# Patient Record
Sex: MALE | Race: BLACK OR AFRICAN AMERICAN | URBAN - METROPOLITAN AREA
[De-identification: names, ages, dates, MRNs, and addresses within clinical notes are randomized per-mention and may not be internally consistent; named-entity substitution may affect disease eponyms.]

---

## 2018-07-29 ENCOUNTER — EMERGENCY (EMERGENCY)
Facility: HOSPITAL | Age: 38
LOS: 1 days | Discharge: HOME | End: 2018-07-29
Attending: EMERGENCY MEDICINE
Payer: COMMERCIAL

## 2018-07-29 VITALS
OXYGEN SATURATION: 99 % | RESPIRATION RATE: 18 BRPM | TEMPERATURE: 99 F | HEART RATE: 80 BPM | SYSTOLIC BLOOD PRESSURE: 170 MMHG | DIASTOLIC BLOOD PRESSURE: 89 MMHG

## 2018-07-29 VITALS
RESPIRATION RATE: 16 BRPM | TEMPERATURE: 98 F | HEART RATE: 76 BPM | WEIGHT: 214.95 LBS | HEIGHT: 67 IN | OXYGEN SATURATION: 98 % | SYSTOLIC BLOOD PRESSURE: 161 MMHG | DIASTOLIC BLOOD PRESSURE: 93 MMHG

## 2018-07-29 PROCEDURE — 99283 EMERGENCY DEPT VISIT LOW MDM: CPT

## 2018-07-29 RX ORDER — CIPROFLOXACIN AND DEXAMETHASONE 3; 1 MG/ML; MG/ML
4 SUSPENSION/ DROPS AURICULAR (OTIC) ONCE
Qty: 0 | Refills: 0 | Status: COMPLETED | OUTPATIENT
Start: 2018-07-29 | End: 2018-07-29

## 2018-07-29 RX ORDER — IBUPROFEN 200 MG
600 TABLET ORAL ONCE
Qty: 0 | Refills: 0 | Status: COMPLETED | OUTPATIENT
Start: 2018-07-29 | End: 2018-07-29

## 2018-07-29 RX ADMIN — CIPROFLOXACIN AND DEXAMETHASONE 4 DROP(S): 3; 1 SUSPENSION/ DROPS AURICULAR (OTIC) at 14:40

## 2018-07-29 RX ADMIN — Medication 600 MILLIGRAM(S): at 13:00

## 2018-07-29 RX ADMIN — Medication 600 MILLIGRAM(S): at 12:16

## 2018-07-29 NOTE — ED PROVIDER NOTE - OBJECTIVE STATEMENT
Attending note. Patient was seen in the fast track room #5. Patient is complaining of foreign body in the right ear today. Patient was attempted to scratch is here with a pin and the plastic and came off in his right ear.

## 2018-07-29 NOTE — ED SUB INTERN NOTE - MEDICAL DECISION MAKING DETAILS
38yM w/ round foreign body lodged tightly in R ear. Will attempt to remove with curette, irrigation, suction. 38yM w/ round foreign body lodged tightly in R ear. Will attempt to remove with curette, irrigation, suction, glue with Dermabond.  Ibuprofen for pain after instrumentation.  ENT consulted

## 2018-07-29 NOTE — ED SUB INTERN NOTE - PHYSICAL EXAMINATION
HEENT: White jen-shaped bead lodged in R-external ear canal--wedged tightly with no space around it visualized. Small abrasion to posterior ear canal without bleeding.  NEURO: non focal, CN2-12 intact, no motor/sensory deficits  HEART: RRR, s1 s2, no m/r/g  LUNGS: CTAB  ABD: NBS, SNTND  EXT: pulses equal b/l, no weakness, edema

## 2018-07-29 NOTE — ED PROVIDER NOTE - MEDICAL DECISION MAKING DETAILS
Attending note-foreign body right ear canal. We'll attempt to curette, irrigation, suction, glue with Dermabond.  if unsuccessful, ENT consultation.

## 2018-07-29 NOTE — ED SUB INTERN NOTE - OBJECTIVE STATEMENT FT
38yM with no PMH p/w foreign body in R ear. 1 hr ago 38yM with no PMH p/w foreign body in R ear. 1 hr ago, pt was scratching an itch with the blunt end of a push pin when the round plastic bead became unattached to the pin and lodged in the ear canal. Pt reports muffled hearing on the R-side. He used the sharp end of the pin in an attempt to gently dig out the bead. Denies pain, bleeding, tinnitus, paresthesias, CP, SOB, n/v, HA, dizziness. 38yM with no PMH p/w foreign body in R ear. 1 hr ago, pt was scratching an itch with the blunt end of a push pin when the round plastic bead became unattached to the pin and lodged in the ear canal. Pt reports muffled hearing on the R-side. He used the sharp end of the pin in an attempt to gently dig out the bead. Denies pain, bleeding, tinnitus, paresthesias, fevers, chills, CP, SOB, n/v, HA, dizziness.

## 2018-07-29 NOTE — ED ADULT NURSE NOTE - OBJECTIVE STATEMENT
89 year old male patient presents to ED c/o foreign body to R ear. Patient states he was using a pin to scratch his ear at about 9am when the top piece (a small jen) broke off and got stuck in his ear. Denies taking pain medication PTA. MD at bedside to attempt to remove- patient reporting pain after removal attempt. Denies HA or dizziness.

## 2018-07-29 NOTE — ED PROVIDER NOTE - PHYSICAL EXAMINATION
Attending note. Patient is alert and in no acute distress. Examination of the right ear reveals a foreign body in the right ear canal. There is a white spherical foreign body wedge in the ear canal. Tympanic membrane cannot be visualized.

## 2018-07-29 NOTE — ED PROVIDER NOTE - PROGRESS NOTE DETAILS
Spoke to ENT PA who received recommendations from Dr. Albert: pt to be given Ciprodex drops and instructed to take 4 drops BID x 7 days in affected ear, beginning today, and will also call the instructed number today to arrange clinic f/u for tonight or tomorrow. ENT no longer has the equipment stocked in hospital for foreign body removal -Matthieu

## 2018-07-30 ENCOUNTER — EMERGENCY (EMERGENCY)
Facility: HOSPITAL | Age: 38
LOS: 1 days | End: 2018-07-30
Attending: EMERGENCY MEDICINE
Payer: COMMERCIAL

## 2018-07-30 VITALS
TEMPERATURE: 98 F | RESPIRATION RATE: 16 BRPM | HEIGHT: 68 IN | SYSTOLIC BLOOD PRESSURE: 154 MMHG | OXYGEN SATURATION: 96 % | DIASTOLIC BLOOD PRESSURE: 89 MMHG | WEIGHT: 220.02 LBS | HEART RATE: 86 BPM

## 2018-07-30 PROCEDURE — 99284 EMERGENCY DEPT VISIT MOD MDM: CPT

## 2018-07-30 PROCEDURE — 99285 EMERGENCY DEPT VISIT HI MDM: CPT | Mod: 25

## 2018-07-30 PROCEDURE — 31575 DIAGNOSTIC LARYNGOSCOPY: CPT

## 2018-07-30 NOTE — ED PROVIDER NOTE - RIGHT EAR
erythema to EAC s/p FB removal; TM intact/TM clear erythema and minimal bleeding in EAC s/p FB removal; TM intact/TM clear

## 2018-07-30 NOTE — ED PROVIDER NOTE - PROGRESS NOTE DETAILS
Successful small, round, white foreign body removal from R ear by Dr. Albert. Pt instructed to continue ciprodex drops. Pt feeling much better, reports he can now hear out of R ear. - Althea Fofana PA-C

## 2018-07-30 NOTE — ED PROVIDER NOTE - PHYSICAL EXAMINATION
Patient evaluated after removal of foreign body. Patient evaluated after removal of foreign body.       Attending note. After removal of foreign body by Dr. Albert your canal was examined and was slightly edematous with a abrasion on the posterior aspect of the canal with some bleeding. Tympanic membrane was intact. Gross hearing is intact.

## 2018-07-30 NOTE — CONSULT NOTE ADULT - SUBJECTIVE AND OBJECTIVE BOX
HPI: The patient is a 38 year old male with no significant past medical history, who presents to the emergency room at [facility] with chief complaint of right ear pain for the past day.  The patient had a small ball of push pin stuck in ear since yesterday.  He was attempting to scratch ear when ball fell off. Pt seen in [facility] ED early yesterday afternoon, and after unsuccessful removal, was d/c to f/u with ENT. Patient with occasional dripping feeling in back of throat and occasional changes in voice.	    HIV:    HIV Status:  · Offered: Declined	    PMHx:  none    PSHx:  none    Meds:  none    All:  NKDA    SH:  occasional ETOH  denies tobacco

## 2018-07-30 NOTE — CONSULT NOTE ADULT - ASSESSMENT
Assessment:  The patient is a 38 year old male with no significant past medical history, who presents to the emergency room at [facility] with chief complaint of right ear pain for the past day.  Ddx GERD and foreign body right external ear canal    Plan:  1) removal of right ear canal foreign body at bedside  2) ciprodex otic bid x 5 days  3) f/u with PMD prn

## 2018-07-30 NOTE — CONSULT NOTE ADULT - COMMENTS
Vital Signs Last 24 Hrs  T(C): 36.7 (30 Jul 2018 18:59), Max: 36.7 (30 Jul 2018 18:59)  T(F): 98 (30 Jul 2018 18:59), Max: 98 (30 Jul 2018 18:59)  HR: 86 (30 Jul 2018 18:59) (86 - 86)  BP: 154/89 (30 Jul 2018 18:59) (154/89 - 154/89)  BP(mean): --  RR: 16 (30 Jul 2018 18:59) (16 - 16)  SpO2: 96% (30 Jul 2018 18:59) (96% - 96%)

## 2018-07-30 NOTE — ED PROVIDER NOTE - OBJECTIVE STATEMENT
37yo M with no PMH presenting for second day in a row with foreign body in R ear. Pt with small ball of push pin stuck in ear since yesterday, was attempting to scratch ear when ball fell off. Pt seen in [facility] ED early yesterday afternoon, and after unsuccessful removal, was d/c to f/u with ENT. Patient sent in tonight by ENT Dr. Albert. Dr. Albert at patient bedside upon arrival to Fast [facility]. 39yo M with no PMH presenting for second day in a row with foreign body in R ear. Pt with small ball of push pin stuck in ear since yesterday, was attempting to scratch ear when ball fell off. Pt seen in [facility] ED early yesterday afternoon, and after unsuccessful removal, was d/c with ciprodex drops and to f/u with ENT. Patient sent in tonight by ENT Dr. Albert. Dr. Albert at patient bedside upon arrival to [facility]. 39yo M with no PMH presenting for second day in a row with foreign body in R ear. Pt with small ball of push pin stuck in ear since yesterday, was attempting to scratch ear when ball fell off. Pt seen in [facility] ED early yesterday afternoon, and after unsuccessful removal, was d/c with ciprodex drops and to f/u with ENT. Patient sent in tonight by ENT Dr. Albert. Dr. Albert at patient bedside upon arrival to [facility].       Attending note. Patient was seen in the fast track room #5. Patient has a foreign body in his right ear and was requested to come into the ER by Dr. Albert for removal this evening. Patient was seen by this practitioner yesterday and we were not able to remove the foreign body. Patient was seen by ENT PA today who is also unable to remove the foreign body. Patient is complaining of pain and irritation in the right ear. Patient has been using Ciprodex drops

## 2018-07-30 NOTE — ED PROVIDER NOTE - MEDICAL DECISION MAKING DETAILS
Attending note-removal of foreign body by ENT Dr. Albert. Continue Ciprodex drops. Followup with ENT.

## 2018-07-30 NOTE — ED PROVIDER NOTE - CARE PLAN
Principal Discharge DX:	Foreign body in ear lobe, right, subsequent encounter  Assessment and plan of treatment:	Continue Ciprodex drops as instructed.   Take Motrin 600mg every 8hrs for pain as needed. Take with food.   DO NOT put any foreign objects into your ears.  FOllow up with PCP and/or ENT, Dr. Albert, within 1-2 days.  Return to ER for worsening pain, drainage from ear, decreased hearing, fevers, or any other concerns. Principal Discharge DX:	Foreign body in ear, right, subsequent encounter  Assessment and plan of treatment:	Continue Ciprodex drops as instructed.   Take Motrin 600mg every 8hrs for pain as needed. Take with food.   DO NOT put any foreign objects into your ears.  FOllow up with PCP and/or ENT, Dr. Albert, within 1-2 days.  Return to ER for worsening pain, drainage from ear, decreased hearing, fevers, or any other concerns.

## 2018-07-30 NOTE — CHART NOTE - NSCHARTNOTEFT_GEN_A_CORE
[facility]  Head & Neck Surgery Procedure Note      Name:                  Yaritza Gilbert	                Surgeon:   Graeme Albert MD	  				  Date of Procedure: 07/30/2018                           Assistant:  None    Record Number:	[id number]                             Anesthesia:  Topical Anesthesia       Preoperative diagnosis:	Dysphagia, unspecified (R13.10)  	  Postoperative diagnosis:	Dysphagia, unspecified (R13.10)    Procedure:		Flexible Fiberoptic Laryngoscopy  (46104)    INDICATION:   The patient is a 38 year old male with no significant past medical history, who presents to the emergency room at [facility] with chief complaint of right ear pain for the past day.  The patient had a small ball of push pin stuck in ear since yesterday.  He was attempting to scratch ear when ball fell off. Pt seen in [facility] ED early yesterday afternoon, and after unsuccessful removal, was d/c to f/u with ENT. Patient with occasional dripping feeling in back of throat and occasional changes in voice.    PROCEDURE: The patient was seen and his bilateral nasal cavities were prepped and sprayed with topical anesthesia of neosynephrine.   Following this, a fiberoptic flexible laryngoscope was passed into the left nasal cavity, and then slowly and meticulously moved posteriorly to the nasopharynx.  There was clear mucous within the nasal cavity.  Once at nasopharynx the skull base and lateral walls of the eustachian tubes were examined for lesions and masses.  There was no blood or masses within the nasopharynx. There was mild prominence of the nasopharyngeal soft tissue consistent with inflammatory reaction.  The fiberoptic endoscope was then carefully directed inferiorly and moved to the hypopharynx and glottis.  There was no fullness of the base of tongue.  There was 1-2+ prominence of the tonsils bilaterally (equally) and without exudate. There was no evidence of retropharyngeal erythema or edema.  There was mild  diffuse erythema  of the pharyngeal wall and supraglottic structure consistent with GERD.  The true vocal cords appeared to be mobile bilaterally.  There was no evidence of foreign body or pooling of secretions, or obvious aspiration or penetration of liquid into the glottis.  The airway was widely patent. The patient tolerated the procedure well.

## 2018-07-30 NOTE — ED PROVIDER NOTE - PRINCIPAL DIAGNOSIS
Foreign body in ear lobe, right, subsequent encounter Foreign body in ear, right, subsequent encounter

## 2018-07-30 NOTE — CHART NOTE - NSCHARTNOTEFT_GEN_A_CORE
[facility]  Head & Neck Surgery Procedure Note      Name:                  Yaritza Gilbert	                Surgeon:   Graeme Albert MD	  				  Date of Procedure: 07/30/2018                           Assistant:  None    Record Number:	[id number]                             Anesthesia:  Topical Anesthesia [facility]  Head & Neck Surgery Procedure Note      Name:                  Yaritza Gilbert	                Surgeon:   Graeme Albert MD	  				  Date of Procedure: 07/30/2018                           Assistant:  None    Record Number:	[id number]                             Anesthesia:  No Anesthesia    Preoperative diagnosis:	Foreign Body in Ear, initial (T16.9XXA)  	  Postoperative diagnosis:	Same    Procedure:	                 Removal of Foreign Body from Ear Canal (16978)    INDICATION:  The patient is a 38 year old male with no significant past medical history, who presents to the emergency room at [facility] with chief complaint of right ear pain for the past day.  The patient had a small ball of push pin stuck in ear since yesterday.  He was attempting to scratch ear when ball fell off. Pt seen in [facility] ED early yesterday afternoon, and after unsuccessful removal.    PROCEDURE: The patient was seen and he was placed supine on a procedure bed.  Attention was turned to the right ear. Next, an otic speculum was placed in the right ear external auditory canal.  Under binaucular microscopy, the external auditory canal was visualized.  The membranous ear canal was cleaned of blood using a tanner tip suction and an ear curret. The bony ear canal was then entered and there appeared to be a waxy foreign body which was gently grasped with an alligator forceps and attempted to be removed. This did not move the foreign body. Next a right angle curette was introduced into the canal and passed medial to the foreign body. The right angle curette was then rotated 90 degrees and then retracted, pulling the foreign body out of the ear canal.  There was minimal bleeding in the ear canal.  Tympanic membrane was then identified and appeared to be intact with no evidence of perforation.  Careful inspection of the tympanic membrane demonstrated no evidence of fluid behind the tympanic membrane within the middle ear space consistent with serous otitis. The otic speculum was then removed.  The patient tolerated the procedure well with no complications.

## 2018-07-30 NOTE — ED PROVIDER NOTE - PLAN OF CARE
Continue Ciprodex drops as instructed.   Take Motrin 600mg every 8hrs for pain as needed. Take with food.   DO NOT put any foreign objects into your ears.  FOllow up with PCP and/or ENT, Dr. Albert, within 1-2 days.  Return to ER for worsening pain, drainage from ear, decreased hearing, fevers, or any other concerns.

## 2018-07-30 NOTE — ED ADULT NURSE NOTE - OBJECTIVE STATEMENT
38y male presents to ED complaining of foreign body in ear. Pt states he was scratching his ear with back of push pin when the tip broke up. Pt seen hear with unsuccessful attempt at removal. ENT at bedside at time of patients arrival.

## 2020-04-25 ENCOUNTER — MESSAGE (OUTPATIENT)
Age: 40
End: 2020-04-25

## 2021-08-05 NOTE — ED PROVIDER NOTE - CARDIAC, MLM
Normal rate, regular rhythm.  Heart sounds S1, S2.  No murmurs, rubs or gallops. No significant past surgical history